# Patient Record
Sex: FEMALE | Race: WHITE | NOT HISPANIC OR LATINO
[De-identification: names, ages, dates, MRNs, and addresses within clinical notes are randomized per-mention and may not be internally consistent; named-entity substitution may affect disease eponyms.]

---

## 2017-01-02 ENCOUNTER — TRANSCRIPTION ENCOUNTER (OUTPATIENT)
Age: 32
End: 2017-01-02

## 2017-02-11 ENCOUNTER — EMERGENCY (EMERGENCY)
Facility: HOSPITAL | Age: 32
LOS: 1 days | Discharge: HOME | End: 2017-02-11
Attending: EMERGENCY MEDICINE | Admitting: EMERGENCY MEDICINE
Payer: COMMERCIAL

## 2017-02-11 VITALS
SYSTOLIC BLOOD PRESSURE: 135 MMHG | OXYGEN SATURATION: 99 % | HEART RATE: 79 BPM | RESPIRATION RATE: 18 BRPM | TEMPERATURE: 99 F | DIASTOLIC BLOOD PRESSURE: 79 MMHG

## 2017-02-11 DIAGNOSIS — R20.0 ANESTHESIA OF SKIN: ICD-10-CM

## 2017-02-11 DIAGNOSIS — R51 HEADACHE: ICD-10-CM

## 2017-02-11 PROCEDURE — 99284 EMERGENCY DEPT VISIT MOD MDM: CPT | Mod: 25

## 2017-02-11 PROCEDURE — 70450 CT HEAD/BRAIN W/O DYE: CPT

## 2017-02-11 PROCEDURE — 70450 CT HEAD/BRAIN W/O DYE: CPT | Mod: 26

## 2017-02-11 RX ORDER — METOCLOPRAMIDE HCL 10 MG
10 TABLET ORAL ONCE
Qty: 0 | Refills: 0 | Status: DISCONTINUED | OUTPATIENT
Start: 2017-02-11 | End: 2017-02-11

## 2017-02-11 RX ORDER — KETOROLAC TROMETHAMINE 30 MG/ML
30 SYRINGE (ML) INJECTION ONCE
Qty: 0 | Refills: 0 | Status: DISCONTINUED | OUTPATIENT
Start: 2017-02-11 | End: 2017-02-11

## 2017-02-11 RX ORDER — SODIUM CHLORIDE 9 MG/ML
1000 INJECTION INTRAMUSCULAR; INTRAVENOUS; SUBCUTANEOUS ONCE
Qty: 0 | Refills: 0 | Status: DISCONTINUED | OUTPATIENT
Start: 2017-02-11 | End: 2017-02-11

## 2017-02-11 RX ORDER — AZITHROMYCIN 500 MG/1
5 TABLET, FILM COATED ORAL
Qty: 0 | Refills: 0 | COMMUNITY

## 2017-02-11 RX ORDER — SUMATRIPTAN SUCCINATE 4 MG/.5ML
0 INJECTION, SOLUTION SUBCUTANEOUS
Qty: 0 | Refills: 0 | COMMUNITY

## 2017-02-11 NOTE — ED ADULT NURSE NOTE - CHIEF COMPLAINT QUOTE
C/O Headache, recent dx of sinus infection prescribed azithromycin today.   Pt states  "I woke up this morning with a lump on head and left sided facial numbness."

## 2017-02-11 NOTE — ED PROVIDER NOTE - ATTENDING CONTRIBUTION TO CARE
31F w/ PMH migraine w/ recent sinus infection, woke up this AM with R sided migraine and numbness/tingling in L face, with L eye vision blurriness. ct head, pt declined pain meds, reassess

## 2017-02-11 NOTE — ED ADULT NURSE NOTE - OBJECTIVE STATEMENT
30 yo F arrived to the ED HX of migraines, recent sinus infection; woke up this AM c R sided HA and numbness/tingling to L face; L eye vision blurriness; denies fevers/chills; denies nvd; vitals stable; gross neuro intact

## 2017-02-11 NOTE — ED PROVIDER NOTE - OBJECTIVE STATEMENT
31F w/ PMH migraine w/ recent sinus infection, woke up this AM with R sided migraine and numbness/tingling in L face, with L eye vision blurriness. Pt also report tenderness and bump over her L eyebrow. Pt denies fevers, chills, n/v, CP, SOB, weakness. 31F w/ PMH migraine w/ recent sinus infection, woke up this AM with R sided migraine and numbness/tingling in L face, with L eye vision blurriness. Pt also report tenderness and bump over her L eyebrow. Pt denies fevers, chills, n/v, CP, SOB, weakness.  BRAN: H/A resolved in ED on arrival. no fever, c/o L forhead above eye brow numbness, no facial weakness,

## 2017-02-11 NOTE — ED PROVIDER NOTE - MEDICAL DECISION MAKING DETAILS
ct head 31F w/ PMH migraine w/ recent sinus infection, woke up this AM with R sided migraine and numbness/tingling in L face, with L eye vision blurriness. ct head, pt declined pain meds, reassess

## 2017-03-30 ENCOUNTER — APPOINTMENT (OUTPATIENT)
Dept: ORTHOPEDIC SURGERY | Facility: CLINIC | Age: 32
End: 2017-03-30

## 2017-03-30 VITALS — WEIGHT: 183 LBS | HEIGHT: 68 IN | BODY MASS INDEX: 27.74 KG/M2

## 2017-03-30 VITALS — DIASTOLIC BLOOD PRESSURE: 87 MMHG | HEART RATE: 94 BPM | SYSTOLIC BLOOD PRESSURE: 142 MMHG

## 2017-03-30 DIAGNOSIS — Z78.9 OTHER SPECIFIED HEALTH STATUS: ICD-10-CM

## 2017-03-30 DIAGNOSIS — R22.31 LOCALIZED SWELLING, MASS AND LUMP, RIGHT UPPER LIMB: ICD-10-CM

## 2017-03-30 DIAGNOSIS — Z80.3 FAMILY HISTORY OF MALIGNANT NEOPLASM OF BREAST: ICD-10-CM

## 2017-03-30 RX ORDER — SUMATRIPTAN 100 MG/1
100 TABLET, FILM COATED ORAL
Refills: 0 | Status: ACTIVE | COMMUNITY

## 2017-04-19 ENCOUNTER — APPOINTMENT (OUTPATIENT)
Dept: ORTHOPEDIC SURGERY | Facility: HOSPITAL | Age: 32
End: 2017-04-19

## 2018-09-27 PROBLEM — G43.009 MIGRAINE WITHOUT AURA, NOT INTRACTABLE, WITHOUT STATUS MIGRAINOSUS: Chronic | Status: ACTIVE | Noted: 2017-02-11

## 2018-10-03 ENCOUNTER — OUTPATIENT (OUTPATIENT)
Dept: OUTPATIENT SERVICES | Facility: HOSPITAL | Age: 33
LOS: 1 days | End: 2018-10-03
Payer: COMMERCIAL

## 2018-10-03 ENCOUNTER — APPOINTMENT (OUTPATIENT)
Dept: ULTRASOUND IMAGING | Facility: CLINIC | Age: 33
End: 2018-10-03
Payer: COMMERCIAL

## 2018-10-03 ENCOUNTER — APPOINTMENT (OUTPATIENT)
Dept: MAMMOGRAPHY | Facility: CLINIC | Age: 33
End: 2018-10-03
Payer: COMMERCIAL

## 2018-10-03 DIAGNOSIS — Z00.8 ENCOUNTER FOR OTHER GENERAL EXAMINATION: ICD-10-CM

## 2018-10-03 PROCEDURE — 77066 DX MAMMO INCL CAD BI: CPT | Mod: 26

## 2018-10-03 PROCEDURE — G0279: CPT | Mod: 26

## 2018-10-03 PROCEDURE — 77067 SCR MAMMO BI INCL CAD: CPT

## 2018-10-03 PROCEDURE — 77066 DX MAMMO INCL CAD BI: CPT

## 2018-10-03 PROCEDURE — 77063 BREAST TOMOSYNTHESIS BI: CPT

## 2018-10-03 PROCEDURE — G0279: CPT

## 2018-10-03 PROCEDURE — 77065 DX MAMMO INCL CAD UNI: CPT

## 2018-10-03 PROCEDURE — 76641 ULTRASOUND BREAST COMPLETE: CPT | Mod: 26,50

## 2018-10-03 PROCEDURE — 76641 ULTRASOUND BREAST COMPLETE: CPT

## 2019-01-10 ENCOUNTER — TRANSCRIPTION ENCOUNTER (OUTPATIENT)
Age: 34
End: 2019-01-10

## 2019-03-21 ENCOUNTER — RESULT REVIEW (OUTPATIENT)
Age: 34
End: 2019-03-21

## 2019-04-03 ENCOUNTER — APPOINTMENT (OUTPATIENT)
Dept: MAMMOGRAPHY | Facility: CLINIC | Age: 34
End: 2019-04-03
Payer: COMMERCIAL

## 2019-04-03 ENCOUNTER — OUTPATIENT (OUTPATIENT)
Dept: OUTPATIENT SERVICES | Facility: HOSPITAL | Age: 34
LOS: 1 days | End: 2019-04-03
Payer: COMMERCIAL

## 2019-04-03 DIAGNOSIS — Z00.8 ENCOUNTER FOR OTHER GENERAL EXAMINATION: ICD-10-CM

## 2019-04-03 PROCEDURE — 77065 DX MAMMO INCL CAD UNI: CPT | Mod: 26,RT

## 2019-04-03 PROCEDURE — 77065 DX MAMMO INCL CAD UNI: CPT

## 2019-06-16 NOTE — ED PROVIDER NOTE - MUSCULOSKELETAL, MLM
Pt A&Ox2. VSS, afebrile. No s/s of distress noted at this time. Spine appears normal, range of motion is not limited, no muscle or joint tenderness

## 2020-03-12 ENCOUNTER — APPOINTMENT (OUTPATIENT)
Dept: ORTHOPEDIC SURGERY | Facility: CLINIC | Age: 35
End: 2020-03-12
Payer: COMMERCIAL

## 2020-03-12 DIAGNOSIS — S61.419A LACERATION W/OUT FOREIGN BODY OF UNSPECIFIED HAND, INITIAL ENCOUNTER: ICD-10-CM

## 2020-03-12 PROCEDURE — 99213 OFFICE O/P EST LOW 20 MIN: CPT

## 2020-03-12 NOTE — HISTORY OF PRESENT ILLNESS
[Right] : right hand dominant [FreeTextEntry1] : Patient is a RHD 34 year old female who presents with a laceration to her left hand.  She cut herself with a knife 12 days ago. She was seen at [facility] MD where the longitudinal wound was cleaned and sutures were placed.  She was given a Tetanus shot but not antibiotics.  She returned 10 days later for suture removal and she was told that the wound was not completely healed.  Steri-strips were then applied.  She is concerned about the closure of the wound and wanted to have it checked today.

## 2020-03-12 NOTE — ADDENDUM
[FreeTextEntry1] : I, Yuri Shaikh MD, ordering physician, have read and attest that all the information, medical decision making and discharge instructions within are true and accurate.

## 2020-03-12 NOTE — PHYSICAL EXAM
[de-identified] : Patient is WDWN, alert, and in no acute distress. Breathing is unlabored. She is grossly oriented to person, place, and time. \par \par Left hand: Well-healing longitudinal incision over the dorsal base of the thumb. No signs of infection. There is no tenderness to palpation or pain with axial compression. There is full arc of motion in the fingers. All intrinsic and extrinsic hand muscles 5/5. No joint instability on provocative testing. Sensation is intact to light touch.  [de-identified] : No imaging done today.

## 2020-03-12 NOTE — DISCUSSION/SUMMARY
[FreeTextEntry1] : The underlying pathophysiology was reviewed with the patient. Treatment options were discussed including; observation, NSAIDS, analgesics. \par \par Traumatic laceration left thumb. \par \par Patient was encouraged to apply Neosporin and to keep the area dry until this weekend. \par Follow up as needed.

## 2020-03-12 NOTE — END OF VISIT
[FreeTextEntry3] : I, Yuri Shaikh MD, ordering physician, have read and attest that all the information, medical decision making and discharge instructions within are true and accurate.

## 2020-09-22 NOTE — ED PROVIDER NOTE - EYES [+], MLM
L eye blurry vision/VISUAL CHANGES Is This A New Presentation, Or A Follow-Up?: Skin Lesion How Severe Is Your Skin Lesion?: moderate Has Your Skin Lesion Been Treated?: not been treated Additional History: Pt presents for skin lesion on right calf. Pt reports bleeding since yesterday but denies trauma, picking at area, irritation, etc. pt denies other concerns.

## 2020-10-15 ENCOUNTER — RESULT REVIEW (OUTPATIENT)
Age: 35
End: 2020-10-15

## 2020-12-09 ENCOUNTER — APPOINTMENT (OUTPATIENT)
Dept: ULTRASOUND IMAGING | Facility: CLINIC | Age: 35
End: 2020-12-09
Payer: COMMERCIAL

## 2020-12-09 ENCOUNTER — OUTPATIENT (OUTPATIENT)
Dept: OUTPATIENT SERVICES | Facility: HOSPITAL | Age: 35
LOS: 1 days | End: 2020-12-09
Payer: COMMERCIAL

## 2020-12-09 ENCOUNTER — APPOINTMENT (OUTPATIENT)
Dept: MAMMOGRAPHY | Facility: CLINIC | Age: 35
End: 2020-12-09
Payer: COMMERCIAL

## 2020-12-09 DIAGNOSIS — Z12.31 ENCOUNTER FOR SCREENING MAMMOGRAM FOR MALIGNANT NEOPLASM OF BREAST: ICD-10-CM

## 2020-12-09 PROCEDURE — G0279: CPT | Mod: 26

## 2020-12-09 PROCEDURE — 77066 DX MAMMO INCL CAD BI: CPT | Mod: 26

## 2020-12-09 PROCEDURE — G0279: CPT

## 2020-12-09 PROCEDURE — 77066 DX MAMMO INCL CAD BI: CPT

## 2020-12-09 PROCEDURE — 76641 ULTRASOUND BREAST COMPLETE: CPT | Mod: 26,50

## 2020-12-09 PROCEDURE — 76641 ULTRASOUND BREAST COMPLETE: CPT

## 2023-06-01 ENCOUNTER — APPOINTMENT (OUTPATIENT)
Dept: MAMMOGRAPHY | Facility: CLINIC | Age: 38
End: 2023-06-01
Payer: COMMERCIAL

## 2023-06-01 ENCOUNTER — APPOINTMENT (OUTPATIENT)
Dept: ULTRASOUND IMAGING | Facility: CLINIC | Age: 38
End: 2023-06-01
Payer: COMMERCIAL

## 2023-06-01 PROCEDURE — 77063 BREAST TOMOSYNTHESIS BI: CPT

## 2023-06-01 PROCEDURE — 76641 ULTRASOUND BREAST COMPLETE: CPT | Mod: 50

## 2023-06-01 PROCEDURE — 77067 SCR MAMMO BI INCL CAD: CPT

## 2023-07-26 NOTE — ED ADULT TRIAGE NOTE - CHIEF COMPLAINT QUOTE
no
C/O Headache, recent dx of sinus infection prescribed azithromycin today.   Pt states  "I woke up this morning with a lump on head and left sided facial numbness."